# Patient Record
Sex: FEMALE | Race: BLACK OR AFRICAN AMERICAN | NOT HISPANIC OR LATINO | Employment: OTHER | URBAN - METROPOLITAN AREA
[De-identification: names, ages, dates, MRNs, and addresses within clinical notes are randomized per-mention and may not be internally consistent; named-entity substitution may affect disease eponyms.]

---

## 2022-05-26 ENCOUNTER — DOCUMENTATION ONLY (OUTPATIENT)
Dept: OTHER | Facility: CLINIC | Age: 47
End: 2022-05-26
Payer: MEDICARE

## 2022-06-03 ENCOUNTER — TELEPHONE (OUTPATIENT)
Dept: NURSING | Facility: CLINIC | Age: 47
End: 2022-06-03
Payer: MEDICARE

## 2022-06-03 NOTE — TELEPHONE ENCOUNTER
Spoke Tabatha Vega director of [facility] for [facility]. No consent on file to speak to her. Provided her our phone number to give to the guardian to call back    Outreach attempt made to review COVID testing options for upcoming procedure. Patient asked to call back [phone] to discuss further.     Ellyn Guerin LPN

## 2022-06-08 RX ORDER — FOLIC ACID 1 MG/1
1 TABLET ORAL EVERY MORNING
COMMUNITY
Start: 2021-07-11

## 2022-06-08 RX ORDER — CLOZAPINE 100 MG/1
TABLET ORAL
COMMUNITY
Start: 2021-10-01

## 2022-06-08 RX ORDER — BACITRACIN 500 [USP'U]/G
OINTMENT OPHTHALMIC
COMMUNITY
Start: 2021-03-07

## 2022-06-08 RX ORDER — LEVETIRACETAM 500 MG/1
TABLET ORAL
COMMUNITY
Start: 2022-05-26

## 2022-06-08 RX ORDER — PAROXETINE 20 MG/1
20 TABLET, FILM COATED ORAL
COMMUNITY
Start: 2022-03-22

## 2022-06-08 RX ORDER — POLYETHYLENE GLYCOL 3350 17 G/17G
POWDER, FOR SOLUTION ORAL
COMMUNITY
Start: 2022-03-25

## 2022-06-08 RX ORDER — DIAZEPAM 5 MG
TABLET ORAL
COMMUNITY
Start: 2022-05-26

## 2022-06-08 RX ORDER — DESMOPRESSIN ACETATE 0.2 MG/1
200 TABLET ORAL
COMMUNITY
Start: 2022-03-22

## 2022-06-08 RX ORDER — BISACODYL 10 MG
SUPPOSITORY, RECTAL RECTAL
COMMUNITY
Start: 2020-09-09

## 2022-06-08 RX ORDER — SENNOSIDES A AND B 8.6 MG/1
TABLET, FILM COATED ORAL
COMMUNITY
Start: 2021-09-16

## 2022-06-08 RX ORDER — DOCUSATE SODIUM 100 MG/1
CAPSULE, LIQUID FILLED ORAL
COMMUNITY
Start: 2022-03-03

## 2022-06-08 RX ORDER — BACITRACIN ZINC AND POLYMYXIN B SULFATE 500; 1000 [USP'U]/G; [USP'U]/G
1 OINTMENT TOPICAL
COMMUNITY
Start: 2022-04-04

## 2022-06-08 RX ORDER — LACTULOSE 10 G/15ML
SOLUTION ORAL
COMMUNITY
Start: 2021-06-19

## 2022-06-09 ENCOUNTER — ANESTHESIA EVENT (OUTPATIENT)
Dept: SURGERY | Facility: CLINIC | Age: 47
End: 2022-06-09
Payer: MEDICARE

## 2022-06-10 ENCOUNTER — ANESTHESIA (OUTPATIENT)
Dept: SURGERY | Facility: CLINIC | Age: 47
End: 2022-06-10
Payer: MEDICARE

## 2022-06-10 ENCOUNTER — HOSPITAL ENCOUNTER (OUTPATIENT)
Facility: CLINIC | Age: 47
Discharge: HOME | End: 2022-06-10
Attending: DENTIST | Admitting: DENTIST
Payer: MEDICARE

## 2022-06-10 VITALS
WEIGHT: 218.03 LBS | SYSTOLIC BLOOD PRESSURE: 150 MMHG | TEMPERATURE: 98.1 F | DIASTOLIC BLOOD PRESSURE: 84 MMHG | HEIGHT: 64 IN | OXYGEN SATURATION: 95 % | RESPIRATION RATE: 10 BRPM | HEART RATE: 112 BPM | BODY MASS INDEX: 37.22 KG/M2

## 2022-06-10 DIAGNOSIS — K08.89 PAIN, DENTAL: Primary | ICD-10-CM

## 2022-06-10 LAB — GLUCOSE BLDC GLUCOMTR-MCNC: 89 MG/DL (ref 70–99)

## 2022-06-10 PROCEDURE — 250N000013 HC RX MED GY IP 250 OP 250 PS 637: Performed by: DENTIST

## 2022-06-10 PROCEDURE — 710N000012 HC RECOVERY PHASE 2, PER MINUTE: Performed by: DENTIST

## 2022-06-10 PROCEDURE — 250N000011 HC RX IP 250 OP 636: Performed by: NURSE ANESTHETIST, CERTIFIED REGISTERED

## 2022-06-10 PROCEDURE — 250N000025 HC SEVOFLURANE, PER MIN: Performed by: DENTIST

## 2022-06-10 PROCEDURE — 710N000010 HC RECOVERY PHASE 1, LEVEL 2, PER MIN: Performed by: DENTIST

## 2022-06-10 PROCEDURE — 82962 GLUCOSE BLOOD TEST: CPT

## 2022-06-10 PROCEDURE — 999N000141 HC STATISTIC PRE-PROCEDURE NURSING ASSESSMENT: Performed by: DENTIST

## 2022-06-10 PROCEDURE — 250N000009 HC RX 250: Performed by: NURSE ANESTHETIST, CERTIFIED REGISTERED

## 2022-06-10 PROCEDURE — 360N000075 HC SURGERY LEVEL 2, PER MIN: Performed by: DENTIST

## 2022-06-10 PROCEDURE — 999N000015 HC STATISTIC ARTERIAL MONITORING DAILY

## 2022-06-10 PROCEDURE — 272N000010 HC KIT CATH ARTERIAL EXT SUPPLY

## 2022-06-10 PROCEDURE — 258N000003 HC RX IP 258 OP 636: Performed by: NURSE ANESTHETIST, CERTIFIED REGISTERED

## 2022-06-10 PROCEDURE — 250N000011 HC RX IP 250 OP 636: Performed by: DENTIST

## 2022-06-10 PROCEDURE — 370N000017 HC ANESTHESIA TECHNICAL FEE, PER MIN: Performed by: DENTIST

## 2022-06-10 PROCEDURE — 250N000009 HC RX 250

## 2022-06-10 PROCEDURE — 250N000013 HC RX MED GY IP 250 OP 250 PS 637: Performed by: ANESTHESIOLOGY

## 2022-06-10 PROCEDURE — 250N000024 HC ISOFLURANE, PER MIN: Performed by: DENTIST

## 2022-06-10 RX ORDER — SODIUM CHLORIDE, SODIUM LACTATE, POTASSIUM CHLORIDE, CALCIUM CHLORIDE 600; 310; 30; 20 MG/100ML; MG/100ML; MG/100ML; MG/100ML
INJECTION, SOLUTION INTRAVENOUS CONTINUOUS PRN
Status: DISCONTINUED | OUTPATIENT
Start: 2022-06-10 | End: 2022-06-10

## 2022-06-10 RX ORDER — MIDAZOLAM HYDROCHLORIDE 2 MG/ML
15 SYRUP ORAL ONCE
Status: DISCONTINUED | OUTPATIENT
Start: 2022-06-10 | End: 2022-06-10 | Stop reason: HOSPADM

## 2022-06-10 RX ORDER — ACETAMINOPHEN 325 MG/1
650 TABLET ORAL EVERY 4 HOURS PRN
Qty: 50 TABLET | Refills: 0 | Status: SHIPPED | OUTPATIENT
Start: 2022-06-10

## 2022-06-10 RX ORDER — SODIUM CHLORIDE, SODIUM LACTATE, POTASSIUM CHLORIDE, CALCIUM CHLORIDE 600; 310; 30; 20 MG/100ML; MG/100ML; MG/100ML; MG/100ML
INJECTION, SOLUTION INTRAVENOUS CONTINUOUS
Status: CANCELLED | OUTPATIENT
Start: 2022-06-10

## 2022-06-10 RX ORDER — SODIUM CHLORIDE, SODIUM LACTATE, POTASSIUM CHLORIDE, CALCIUM CHLORIDE 600; 310; 30; 20 MG/100ML; MG/100ML; MG/100ML; MG/100ML
INJECTION, SOLUTION INTRAVENOUS CONTINUOUS
Status: DISCONTINUED | OUTPATIENT
Start: 2022-06-10 | End: 2022-06-10 | Stop reason: HOSPADM

## 2022-06-10 RX ORDER — OXYCODONE HYDROCHLORIDE 5 MG/1
5 TABLET ORAL EVERY 4 HOURS PRN
Status: DISCONTINUED | OUTPATIENT
Start: 2022-06-10 | End: 2022-06-10 | Stop reason: HOSPADM

## 2022-06-10 RX ORDER — PROPOFOL 10 MG/ML
INJECTION, EMULSION INTRAVENOUS PRN
Status: DISCONTINUED | OUTPATIENT
Start: 2022-06-10 | End: 2022-06-10

## 2022-06-10 RX ORDER — MEPERIDINE HYDROCHLORIDE 25 MG/ML
12.5 INJECTION INTRAMUSCULAR; INTRAVENOUS; SUBCUTANEOUS
Status: DISCONTINUED | OUTPATIENT
Start: 2022-06-10 | End: 2022-06-10 | Stop reason: HOSPADM

## 2022-06-10 RX ORDER — CHLORHEXIDINE GLUCONATE ORAL RINSE 1.2 MG/ML
10 SOLUTION DENTAL ONCE
Status: COMPLETED | OUTPATIENT
Start: 2022-06-10 | End: 2022-06-10

## 2022-06-10 RX ORDER — FENTANYL CITRATE 50 UG/ML
25 INJECTION, SOLUTION INTRAMUSCULAR; INTRAVENOUS EVERY 5 MIN PRN
Status: DISCONTINUED | OUTPATIENT
Start: 2022-06-10 | End: 2022-06-10 | Stop reason: HOSPADM

## 2022-06-10 RX ORDER — CEFAZOLIN SODIUM/WATER 2 G/20 ML
2 SYRINGE (ML) INTRAVENOUS SEE ADMIN INSTRUCTIONS
Status: DISCONTINUED | OUTPATIENT
Start: 2022-06-10 | End: 2022-06-10 | Stop reason: HOSPADM

## 2022-06-10 RX ORDER — DEXAMETHASONE SODIUM PHOSPHATE 4 MG/ML
INJECTION, SOLUTION INTRA-ARTICULAR; INTRALESIONAL; INTRAMUSCULAR; INTRAVENOUS; SOFT TISSUE PRN
Status: DISCONTINUED | OUTPATIENT
Start: 2022-06-10 | End: 2022-06-10

## 2022-06-10 RX ORDER — CHLORHEXIDINE GLUCONATE ORAL RINSE 1.2 MG/ML
SOLUTION DENTAL PRN
Status: DISCONTINUED | OUTPATIENT
Start: 2022-06-10 | End: 2022-06-10 | Stop reason: HOSPADM

## 2022-06-10 RX ORDER — FENTANYL CITRATE 50 UG/ML
25 INJECTION, SOLUTION INTRAMUSCULAR; INTRAVENOUS
Status: DISCONTINUED | OUTPATIENT
Start: 2022-06-10 | End: 2022-06-10 | Stop reason: HOSPADM

## 2022-06-10 RX ORDER — ACETAMINOPHEN 325 MG/1
650 TABLET ORAL ONCE
Status: COMPLETED | OUTPATIENT
Start: 2022-06-10 | End: 2022-06-10

## 2022-06-10 RX ORDER — CEFAZOLIN SODIUM/WATER 2 G/20 ML
2 SYRINGE (ML) INTRAVENOUS
Status: COMPLETED | OUTPATIENT
Start: 2022-06-10 | End: 2022-06-10

## 2022-06-10 RX ORDER — ONDANSETRON 2 MG/ML
4 INJECTION INTRAMUSCULAR; INTRAVENOUS EVERY 30 MIN PRN
Status: DISCONTINUED | OUTPATIENT
Start: 2022-06-10 | End: 2022-06-10 | Stop reason: HOSPADM

## 2022-06-10 RX ORDER — LIDOCAINE 40 MG/G
CREAM TOPICAL
Status: CANCELLED | OUTPATIENT
Start: 2022-06-10

## 2022-06-10 RX ORDER — ONDANSETRON 4 MG/1
4 TABLET, ORALLY DISINTEGRATING ORAL EVERY 30 MIN PRN
Status: DISCONTINUED | OUTPATIENT
Start: 2022-06-10 | End: 2022-06-10 | Stop reason: HOSPADM

## 2022-06-10 RX ORDER — ONDANSETRON 2 MG/ML
INJECTION INTRAMUSCULAR; INTRAVENOUS PRN
Status: DISCONTINUED | OUTPATIENT
Start: 2022-06-10 | End: 2022-06-10

## 2022-06-10 RX ORDER — FENTANYL CITRATE 50 UG/ML
INJECTION, SOLUTION INTRAMUSCULAR; INTRAVENOUS PRN
Status: DISCONTINUED | OUTPATIENT
Start: 2022-06-10 | End: 2022-06-10

## 2022-06-10 RX ORDER — HYDROMORPHONE HCL IN WATER/PF 6 MG/30 ML
0.2 PATIENT CONTROLLED ANALGESIA SYRINGE INTRAVENOUS EVERY 5 MIN PRN
Status: DISCONTINUED | OUTPATIENT
Start: 2022-06-10 | End: 2022-06-10 | Stop reason: HOSPADM

## 2022-06-10 RX ORDER — KETOROLAC TROMETHAMINE 30 MG/ML
INJECTION, SOLUTION INTRAMUSCULAR; INTRAVENOUS PRN
Status: DISCONTINUED | OUTPATIENT
Start: 2022-06-10 | End: 2022-06-10

## 2022-06-10 RX ADMIN — PHENYLEPHRINE HYDROCHLORIDE 100 MCG: 10 INJECTION INTRAVENOUS at 13:58

## 2022-06-10 RX ADMIN — KETOROLAC TROMETHAMINE 30 MG: 30 INJECTION, SOLUTION INTRAMUSCULAR at 14:45

## 2022-06-10 RX ADMIN — PHENYLEPHRINE HYDROCHLORIDE 100 MCG: 10 INJECTION INTRAVENOUS at 13:24

## 2022-06-10 RX ADMIN — FENTANYL CITRATE 100 MCG: 50 INJECTION, SOLUTION INTRAMUSCULAR; INTRAVENOUS at 12:09

## 2022-06-10 RX ADMIN — SUGAMMADEX 200 MG: 100 INJECTION, SOLUTION INTRAVENOUS at 14:59

## 2022-06-10 RX ADMIN — SUGAMMADEX 200 MG: 100 INJECTION, SOLUTION INTRAVENOUS at 14:44

## 2022-06-10 RX ADMIN — PHENYLEPHRINE HYDROCHLORIDE 100 MCG: 10 INJECTION INTRAVENOUS at 14:11

## 2022-06-10 RX ADMIN — CHLORHEXIDINE GLUCONATE 10 ML: 1.2 SOLUTION ORAL at 11:40

## 2022-06-10 RX ADMIN — Medication 50 MG: at 12:06

## 2022-06-10 RX ADMIN — Medication 2 G: at 12:31

## 2022-06-10 RX ADMIN — SODIUM CHLORIDE, POTASSIUM CHLORIDE, SODIUM LACTATE AND CALCIUM CHLORIDE: 600; 310; 30; 20 INJECTION, SOLUTION INTRAVENOUS at 12:45

## 2022-06-10 RX ADMIN — PHENYLEPHRINE HYDROCHLORIDE 100 MCG: 10 INJECTION INTRAVENOUS at 13:04

## 2022-06-10 RX ADMIN — PHENYLEPHRINE HYDROCHLORIDE 100 MCG: 10 INJECTION INTRAVENOUS at 13:40

## 2022-06-10 RX ADMIN — PROPOFOL 150 MG: 10 INJECTION, EMULSION INTRAVENOUS at 12:06

## 2022-06-10 RX ADMIN — ACETAMINOPHEN 325MG 650 MG: 325 TABLET ORAL at 11:42

## 2022-06-10 RX ADMIN — DEXAMETHASONE SODIUM PHOSPHATE 10 MG: 4 INJECTION, SOLUTION INTRAMUSCULAR; INTRAVENOUS at 13:02

## 2022-06-10 RX ADMIN — ONDANSETRON 4 MG: 2 INJECTION INTRAMUSCULAR; INTRAVENOUS at 14:35

## 2022-06-10 NOTE — ANESTHESIA PREPROCEDURE EVALUATION
Anesthesia Pre-Procedure Evaluation    Patient: Ann Sunshine   MRN:     [id number] Gender:   female   Age:    47 year old :      1975        Procedure(s):  Bilateral Dental Exam, Radiograph, Dental Restorations, Dental Extractions, Pulpotomy, Root Canal Therapy, Biopsy, Frenectomy, Gingivectomy, Alveoloplasty, Periodontal Therapy, Fluoride Varnish in the Mouth     LABS:  CBC: No results found for: WBC, HGB, HCT, PLT  BMP: No results found for: NA, POTASSIUM, CHLORIDE, CO2, BUN, CR, GLC  COAGS: No results found for: PTT, INR, FIBR  POC: No results found for: BGM, HCG, HCGS  OTHER: No results found for: PH, LACT, A1C, WANDA, PHOS, MAG, ALBUMIN, PROTTOTAL, ALT, AST, GGT, ALKPHOS, BILITOTAL, BILIDIRECT, LIPASE, AMYLASE, NIRAV, TSH, T4, T3, CRP, SED     Preop Vitals    BP Readings from Last 3 Encounters:   No data found for BP    Pulse Readings from Last 3 Encounters:   No data found for Pulse      Resp Readings from Last 3 Encounters:   No data found for Resp    SpO2 Readings from Last 3 Encounters:   No data found for SpO2      Temp Readings from Last 1 Encounters:   No data found for Temp    Ht Readings from Last 1 Encounters:   No data found for Ht      Wt Readings from Last 1 Encounters:   No data found for Wt    There is no height or weight on file to calculate BMI.     LDA:        No past medical history on file.   No past surgical history on file.   Not on File     Anesthesia Evaluation    ROS/Med Hx   Comments: obesity    Cardiovascular Findings - negative ROS    Neuro Findings   Comments: Paranoid schizophrenia  Intellectual disability  Seizure disorder  Urge incontinence  Severe anxiety    Pulmonary Findings - negative ROS          GI/Hepatic/Renal Findings - negative ROS    Endocrine/Metabolic Findings - negative ROS                  PHYSICAL EXAM:   Mental Status/Neuro: Abnormal Mental Status  Abnormal Mental Status: Delayed   Airway: Facies: Feasible  Mallampati: III  Mouth/Opening: Full  TM  distance: > 6 cm  Neck ROM: Full   Respiratory: Auscultation: CTAB     Resp. Rate: Normal     Resp. Effort: Normal      CV: Rhythm: Regular  Rate: Age appropriate  Heart: Normal Sounds  Edema: None   Comments: Chipped teeth                     Anesthesia Plan    ASA Status:  3      Anesthesia Type: General.     - Airway: ETT   Induction: Intravenous.   Maintenance: Balanced.        Consents    Anesthesia Plan(s) and associated risks, benefits, and realistic alternatives discussed. Questions answered and patient/representative(s) expressed understanding.    - Discussed:     - Discussed with:  Patient, Legal Guardian      - Extended Intubation/Ventilatory Support Discussed: No.      - Patient is DNR/DNI Status: No    Use of blood products discussed: No .     Postoperative Care    Pain management: IV analgesics.   PONV prophylaxis: Ondansetron (or other 5HT-3), Dexamethasone or Solumedrol     Comments:    Other Comments: GA with ETT  Risks versus benefits discussed. All questions answered with legal guardian over the phone         Alvin Farrar MD

## 2022-06-10 NOTE — DISCHARGE INSTRUCTIONS

## 2022-06-10 NOTE — ANESTHESIA CARE TRANSFER NOTE
Patient: Ann Sunshine    Procedure: Procedure(s):  Bilateral Dental Exam, Radiograph, One Dental Restoration, One dental Extraction #3, Periodontal Therapy, Fluoride Varnish in the Mouth       Diagnosis: Dental caries [K02.9]  Diagnosis Additional Information: No value filed.    Anesthesia Type:   General     Note:      Level of Consciousness: drowsy  Oxygen Supplementation: face mask    Independent Airway: airway patency satisfactory and stable  Dentition: dentition unchanged  Vital Signs Stable: post-procedure vital signs reviewed and stable  Report to RN Given: handoff report given  Patient transferred to: PACU    Handoff Report: Identifed the Patient, Identified the Reponsible Provider, Reviewed the pertinent medical history, Discussed the surgical course, Reviewed Intra-OP anesthesia mangement and issues during anesthesia, Set expectations for post-procedure period and Allowed opportunity for questions and acknowledgement of understanding      Vitals:  Vitals Value Taken Time   /55 06/10/22 1500   Temp     Pulse 105 06/10/22 1505   Resp 12 06/10/22 1505   SpO2 100 % 06/10/22 1505   Vitals shown include unvalidated device data.    Electronically Signed By: CHUCHO Orozco CRNA  Krupa 10, 2022  3:05 PM

## 2022-06-10 NOTE — ANESTHESIA POSTPROCEDURE EVALUATION
Patient: Ann Sunshine    Procedure: Procedure(s):  Bilateral Dental Exam, Radiograph, One Dental Restoration, One dental Extraction #3, Periodontal Therapy, Fluoride Varnish in the Mouth       Anesthesia Type:  General    Note:  Disposition: Outpatient   Postop Pain Control: Uneventful            Sign Out: Well controlled pain   PONV: No   Neuro/Psych: Uneventful            Sign Out: Acceptable/Baseline neuro status   Airway/Respiratory: Uneventful            Sign Out: Acceptable/Baseline resp. status   CV/Hemodynamics: Uneventful            Sign Out: Acceptable CV status; No obvious hypovolemia; No obvious fluid overload   Other NRE:    DID A NON-ROUTINE EVENT OCCUR? YES    Event details/Postop Comments:  Inadverdent placement of an arterial access for an IV line attempt in the right ante cubital fossa despite ultrasound guidance (very difficult IV stick). Patient received induction medications (propofol, fentanyl, rocuronium) through the line. Noticed pulsations of air bubbles in the line. We confirmed arterial line tracing with transducer.   We removed the line without any incident. The right hand and arm looked fine and we continued to monitor the arm with a pulse throughout the case.   On emergence, patient moved all four extremities and followed commands. The arm was evaluated with no changes in color or loss of pulsations. I informed the guardian about the incident.            Last vitals:  Vitals Value Taken Time   /79 06/10/22 1515   Temp 36.2  C (97.2  F) 06/10/22 1455   Pulse 105 06/10/22 1522   Resp 9 06/10/22 1522   SpO2 97 % 06/10/22 1522   Vitals shown include unvalidated device data.    Electronically Signed By: Alvin Farrar MD  Krupa 10, 2022  3:22 PM

## 2022-06-10 NOTE — ANESTHESIA PROCEDURE NOTES
Airway       Patient location during procedure: OR       Procedure Start/Stop Times: 6/10/2022 12:13 PM  Staff -        CRNA: Nishant Oneil APRN CRNA       Performed By: CRNA  Consent for Airway        Urgency: elective  Indications and Patient Condition       Indications for airway management: martinez-procedural       Induction type:inhalational       Mask difficulty assessment: 2 - vent by mask + OA or adjuvant +/- NMBA    Final Airway Details       Final airway type: endotracheal airway       Successful airway: Nasal and ZULEYMA  Endotracheal Airway Details        Cuffed: yes       Successful intubation technique: direct laryngoscopy       DL Blade Type: MAC 4 (first look with cmac 3 blade not long enough, pt has large tongue)       Grade View of Cords: 1       Intubation device: right nare dilated 30,32 fr NAW with ease, red beavers catheter used to passed NTT through nare.       Position: Right       Measured from: nares       Bite block used: None    Post intubation assessment        Placement verified by: capnometry and equal breath sounds        Number of attempts at approach: 1       Secured with: silk tape       Ease of procedure: easy       Dentition: Intact and Unchanged    Medication(s) Administered   Medication Administration Time: 6/10/2022 12:13 PM

## 2022-06-11 NOTE — OP NOTE
"Operative Note    Subjective:   Ann Sunshine ([id number]) is a 47 year old year old female who has been diagnosed with PMH paranoid schizophrenia,seizure disorder, intellectual disability, urge incontinence obesity and chronic constipation..  The patient was evaluated at [facility] in [place] [address], [place] and was found to be uncooperative.  Due to this patient's inability to cooperate in a dental setting, it was necessary for dental treatment to be completed under general anesthesia in the operating room.  The patient presents to [facility] with staff for dental procedures under General Anesthesia.  The patient was taken to preinduction  and then transported to the operating room , IV was started. Nasoendotracheal intubation was accomplished without complications.     Last Solid Intake: 6/9/22 pm  Last Liquid intake: 6/9/22 pm  Staff denies that patient has cough, cold, nasal congestion.    Staff reports:  Oral Hygiene Brushes:  2 times per day, STAFF ASSISTS; Flossing -none,       Patient Status:  1. H+P was performed by Dr. Sadaf Metcalf, on 5/26/2022.  There are no contraindications to the planned procedure.  2. Allergies: Patient has no known allergies.  3. Labs:  HGB:13.1 Platelets: 248 -PLT,,   4. Vital Signs: BP (!) 150/84   Pulse 112   Temp 98.1  F (36.7  C) (Oral)   Resp 10   Ht 1.626 m (5' 4\")   Wt 98.9 kg (218 lb 0.6 oz)   LMP 06/01/2022 (Approximate)   SpO2 95%   BMI 37.43 kg/m    5. Consents signed and in the chart.     Medications:     No medications prior to admission.                        Objective:  Exam: Extra oral: wnl  IntraOral Soft Tissue Exam: Gingival Inflammation generalized Heavy calculus.   Periodontal Probing depths charted, Hard Tissue Exam as Charted.    Radiographic Findings: FMX Reveals:Root tips #3, fracture/decay 18 MO  Clinical Findings: Caries present on 18 MO , Root tips on 3 with sinus tract on buccal  Missing teeth and restorations " charted  Other Clinical Findings: Fracture/ root tips      Assessment:   Caries: 18 MO   Caries Risk:  MODERATE   Periodontal Diagnosis:  Generalized moderate chronic periodontitis  OH: Poor   Other:  Heavy calculus,  Root tips on 3 mb and db root tips.  Treatment Recommendations: Ext #3, 18 MO composite    Procedure:  Procedure(s):  Bilateral Dental Exam, Radiograph, One Dental Restoration, One dental Extraction #3, Periodontal Therapy, Fluoride Varnish in the Mouth   Time Out completed prior to start of procedure. A Guaze throat pack placed; Exam completed; FMX taken;  Oral tissues thoroughly brushed with CHX 0.12%; Scale and Root Plane all 4 quadrants with cavitron followed by hand instrumentation.  Polish.      Restorations:   # 18 MO composite       Anesthetic Given, 1.7 ml 4% Septo 1:100,000 epi, Extractions: Tooth # 3  elevated and extracted with forceps.  2 radiographs taken to evaluate for palatal root.  Unable to find palatal root clinically or definatively on radiograph. Risks discussed Gel-foam placed,.  Hemostasis confirmed,  Fluoride placed on all surfaces.  Throat pack removed and the posterior oropharynx suctioned.  Estimated blood loss: 6 ml    IV Prescriptions Given:  Ancef premed given   POI:  Given to: Staff   Discussed bleeding risk and pain management.  Acetaminophen 325 mg 2 every 4 hrs prn   RTC:  3mrc; Return to OR in 3-5 years  Circulator: Rosita Roberts, ILEANA; Rossy Pacheco RN  Relief Circulator: Lisa Chavez, ILEANA; Marcela Bermudez, RN and Anesthesiologist: Alvin Farrar MD  CRNA: Nishant Oneil APRN CRNA  No responsible provider has been recorded for the case.  Fletcher VALDEZ present.   Timo Edwards DDS,  Krupa 10, 2022

## (undated) DEVICE — SUCTION TIP YANKAUER W/O VENT K86

## (undated) DEVICE — TOOTHBRUSH ADULT NON STERILE MDS136850

## (undated) DEVICE — GLOVE PROTEXIS W/NEU-THERA 8.0  2D73TE80

## (undated) DEVICE — RX BACITRACIN OINTMENT 0.9G 1/32OZ CUR001109

## (undated) DEVICE — ANTIFOG SOLUTION W/FOAM PAD 31142527

## (undated) DEVICE — GOWN XLG DISP 9545

## (undated) DEVICE — SPONGE PACK THROAT 2X18" 31-708

## (undated) DEVICE — TUBING SUCTION MEDI-VAC 1/4"X20' N620A

## (undated) DEVICE — SOLIDIFIER (USE FOR UP TO 1500CC) MSOLID1500

## (undated) DEVICE — POSITIONER ARMBOARD FOAM 1PAIR LF FP-ARMB1

## (undated) DEVICE — COVER PROBE ULTRASOUND 3D W/GEL 5X96" LF 20-P3D596

## (undated) DEVICE — SOL WATER IRRIG 1000ML BOTTLE 2F7114

## (undated) DEVICE — STRAP KNEE/BODY 31143004

## (undated) DEVICE — LABEL MEDICATION SYSTEM 3303-P

## (undated) DEVICE — SPONGE SURGIFOAM 12 1972

## (undated) DEVICE — SPONGE RAY-TEC 4X8" 7318

## (undated) DEVICE — BASIN SET MAJOR

## (undated) DEVICE — SOL NACL 0.9% IRRIG 1000ML BOTTLE 2F7124

## (undated) DEVICE — PACK SET-UP STD 9102

## (undated) DEVICE — LIGHT HANDLE X2

## (undated) DEVICE — LINEN ORTHO PACK 5446

## (undated) RX ORDER — CHLORHEXIDINE GLUCONATE ORAL RINSE 1.2 MG/ML
SOLUTION DENTAL
Status: DISPENSED
Start: 2022-06-10

## (undated) RX ORDER — ACETAMINOPHEN 325 MG/1
TABLET ORAL
Status: DISPENSED
Start: 2022-06-10

## (undated) RX ORDER — FENTANYL CITRATE-0.9 % NACL/PF 10 MCG/ML
PLASTIC BAG, INJECTION (ML) INTRAVENOUS
Status: DISPENSED
Start: 2022-06-10

## (undated) RX ORDER — PROPOFOL 10 MG/ML
INJECTION, EMULSION INTRAVENOUS
Status: DISPENSED
Start: 2022-06-10

## (undated) RX ORDER — KETOROLAC TROMETHAMINE 30 MG/ML
INJECTION, SOLUTION INTRAMUSCULAR; INTRAVENOUS
Status: DISPENSED
Start: 2022-06-10

## (undated) RX ORDER — CEFAZOLIN SODIUM/WATER 2 G/20 ML
SYRINGE (ML) INTRAVENOUS
Status: DISPENSED
Start: 2022-06-10

## (undated) RX ORDER — FENTANYL CITRATE 50 UG/ML
INJECTION, SOLUTION INTRAMUSCULAR; INTRAVENOUS
Status: DISPENSED
Start: 2022-06-10

## (undated) RX ORDER — ONDANSETRON 2 MG/ML
INJECTION INTRAMUSCULAR; INTRAVENOUS
Status: DISPENSED
Start: 2022-06-10